# Patient Record
(demographics unavailable — no encounter records)

---

## 2025-03-12 NOTE — HISTORY OF PRESENT ILLNESS
[FreeTextEntry1] : Ms. Cintron was referred by Dr. Sangeeta Borjas; for SBT.  She was found to have a large abdominal / pelvic mass. She was sent for an US identifying a 30 cm mass. She then had a follow up CT scan.   Pre-Op Imaging: Office Abdominal US 1/31/23 (Dr. Borjas) Limited pelvic sonogram due to enlarged multicystic  mass extending in both adnexas and seen up to the  kidneys. The total area is approx 30.7 x 27.0 x 30.4 cm.  Areas of echogenic material and thick walls are seen as  well.  CT AP 1/31/22  REPRODUCTIVE ORGANS: Large mass predominantly cystic in nature with internal septations identified emanating from the pelvis. The overall size of the mass measures 27.3 x 21.0 x.29 cm in size. The mass is displacing the uterus to the right. However this is likely right adnexal in origin because the left adnexa is also mildly enlarged Despite the presence of this large multiloculated cystic mass their is no evidence of free fluid or demonstrable peritoneal disease.  Tumor Markers 1/26/23-  = 793  2/6/23- Exploratory laparotomy, right salpingo-oophorectomy, pelvic and para-aortic  lymph node dissection, omentectomy, peritoneal biopsy, left ovarian biopsy, and lysis of adhesions.  Pathology- 1. Fallopian tube and ovary, right, salpingo-oophorectomy: - Ovarian serous borderline tumor, with microinvasion. - Benign fimbriated fallopian tube. Comment: The tumor is extensively sampled. Two foci of microinvasion identified [place] [facility] [place]  [place]12/23- Pelvic sono- Uterus: 7.8 cm x 3.3 cm x 5.4 cm. Within normal limits. Endometrium: 8 mm. Within normal limits. Right ovary: Removed. Left ovary: 3.7 cm x 2.2 cm x 2.7 cm. Within normal limits. Fluid: None.  6/2023-  = 11; Testosterone remains elevated, Free 11.5 pg/mL and total 79.5 pg / mL; she was referred to endocrinology for further evaluation of testosterone levels. She saw Dr. Sabra Claudio in 12/2023 and weight loss was recommended, f/u appt in April.    10/2023  = 11  1/2024- pelvic sono (TA views only)- Uterus: 7.1 cm x 2.9 cm x 4.5 cm. Within normal limits. Endometrium: 3 mm. Within normal limits. Right ovary: Surgically absent. Left ovary: 3.7 cm x 1.6 cm x 3.1 cm. Within normal limits.  2/1/24- - 10  5/2/24- Pelvic sono (abd views only)- Uterus: 9.1 cm x 3.7 cm x 4.6 cm. Within normal limits. No focal finding. Endometrium: 10 mm. The endometrium is mildly thickened and heterogeneous in echotexture. No discrete focal finding or abnormal vascularity is identified. (THIS WAS PERFORMED 2 DAYS BEFORE HER PERIOD)   Right ovary: Surgically absent. No right adnexal cyst or mass is seen.   Left ovary: 3.0 cm x 2.2 cm x 2.7 cm. The left ovary is grossly unremarkable, containing small follicles.f  8/5/24 Pelvic sono:  Uterus: 8.6 cm x 3.5 cm x 4.8 cm. Within normal limits. Endometrium: 3 mm. Within normal limits. Right ovary: Surgically absent Left ovary: 3.8 cm x 1.8 cm x 3.0 cm. 3.9 x 3.8 x 3.5 cm paraovarian complex cyst without hypervascularity containing internal echogenic material, likely hemorrhagic blood product. Fluid: None. IMPRESSION: 3.9 cm left paraovarian hemorrhagic cyst **per patient her period started 2 days after this sono**  8/15/24:  = 12  11/11/24 Sono  (TA/TV views) Uterus: 8.2 cm x 3.3 cm x 4.5 cm. Within normal limits. Endometrium: 9 mm. Within normal limits. Right ovary: Surgically absent Left ovary: 3.9 cm x 2.2 cm x 3.0 cm. Resolution of paraovarian complex cyst likely hemorrhagic in nature. This is no longer identified. Small simple follicle seen Fluid: None. IMPRESSION:: Resolution of previously seen left hemorrhagic ovarian cyst  11/21/24 = 11  2/24/25 Uterus: 7.6 cm x 3.1 cm x 3.0 cm. Within normal limits. Endometrium: 4 mm. Within normal limits. Right ovary: Removed. Left ovary: 3.1 cm x 2.6 cm x 3.1 cm. Within normal limits. Fluid: None. IMPRESSION: Limited transabdominal exam. The patient declined the transvaginal portion.. Normal uterus and left ovary.  Regular periods q28-31d. She denies abdominal/pelvic pain, dyspnea or chest pain, nausea/vomiting, changes in bowel habits or urination, lower extremity edema or pain.   Not sexually active.  She denies any other associated signs and symptoms.   She has followed up with Dr. Claudio for endocrinology and reports she is seeing her q6m for testosterone levels.  She is here with her mother for appt.  Health Maintenance: BMI: 35 Lifestyle: lives with parents Gardasil Vaccine received: yes COVID vaccine received: yes Mammogram: n/a Colonoscopy: n/a PAP: none, pt is virginal  GYN/Ref: Sangeeta Borjas MD PCP: Dr. Que Flores Psych: Dr. Wood Dowling Endocrnology: Dr. Sabra Claudio - sees q6m

## 2025-03-12 NOTE — DISCUSSION/SUMMARY
[Reviewed Clinical Lab Test(s)] : Results of clinical tests were reviewed. [Reviewed Radiology Report(s)] : Radiology reports were reviewed. [FreeTextEntry1] : - reviewed recent pelvic US results; new sono ordered for prior to next visit. -  today (addendum:  = 11; Patient notified via MOA tasklist. LDS) - f/u with endocrinology  - The risk of recurrence, signs and symptoms  and surveillance plan were reviewed.  - She was advised to see me in 6 months, with sono prior.  - GYN HM with Dr. Borjas. - All questions were answered to their apparent satisfaction.

## 2025-03-12 NOTE — HISTORY OF PRESENT ILLNESS
[FreeTextEntry1] : Ms. Cintron was referred by Dr. Sangeeta Borjas; for SBT.  She was found to have a large abdominal / pelvic mass. She was sent for an US identifying a 30 cm mass. She then had a follow up CT scan.   Pre-Op Imaging: Office Abdominal US 1/31/23 (Dr. Borjas) Limited pelvic sonogram due to enlarged multicystic  mass extending in both adnexas and seen up to the  kidneys. The total area is approx 30.7 x 27.0 x 30.4 cm.  Areas of echogenic material and thick walls are seen as  well.  CT AP 1/31/22  REPRODUCTIVE ORGANS: Large mass predominantly cystic in nature with internal septations identified emanating from the pelvis. The overall size of the mass measures 27.3 x 21.0 x.29 cm in size. The mass is displacing the uterus to the right. However this is likely right adnexal in origin because the left adnexa is also mildly enlarged Despite the presence of this large multiloculated cystic mass their is no evidence of free fluid or demonstrable peritoneal disease.  Tumor Markers 1/26/23-  = 793  2/6/23- Exploratory laparotomy, right salpingo-oophorectomy, pelvic and para-aortic  lymph node dissection, omentectomy, peritoneal biopsy, left ovarian biopsy, and lysis of adhesions.  Pathology- 1. Fallopian tube and ovary, right, salpingo-oophorectomy: - Ovarian serous borderline tumor, with microinvasion. - Benign fimbriated fallopian tube. Comment: The tumor is extensively sampled. Two foci of [place]invasion [place] [facility] [place]- Pelvic sono- Uterus: 7.8 cm x 3.3 cm x 5.4 cm. Within normal limits. Endometrium: 8 mm. Within normal limits. Right ovary: Removed. Left ovary: 3.7 cm x 2.2 cm x 2.7 cm. Within normal limits. Fluid: None.  6/2023-  = 11; Testosterone remains elevated, Free 11.5 pg/mL and total 79.5 pg / mL; she was referred to endocrinology for further evaluation of testosterone levels. She saw Dr. Sabra Claudio in 12/2023 and weight loss was recommended, f/u appt in April.    10/2023  = 11  1/2024- pelvic sono (TA views only)- Uterus: 7.1 cm x 2.9 cm x 4.5 cm. Within normal limits. Endometrium: 3 mm. Within normal limits. Right ovary: Surgically absent. Left ovary: 3.7 cm x 1.6 cm x 3.1 cm. Within normal limits.  2/1/24- - 10  5/2/24- Pelvic sono (abd views only)- Uterus: 9.1 cm x 3.7 cm x 4.6 cm. Within normal limits. No focal finding. Endometrium: 10 mm. The endometrium is mildly thickened and heterogeneous in echotexture. No discrete focal finding or abnormal vascularity is identified. (THIS WAS PERFORMED 2 DAYS BEFORE HER PERIOD)   Right ovary: Surgically absent. No right adnexal cyst or mass is seen.   Left ovary: 3.0 cm x 2.2 cm x 2.7 cm. The left ovary is grossly unremarkable, containing small follicles.f  8/5/24 Pelvic sono:  Uterus: 8.6 cm x 3.5 cm x 4.8 cm. Within normal limits. Endometrium: 3 mm. Within normal limits. Right ovary: Surgically absent Left ovary: 3.8 cm x 1.8 cm x 3.0 cm. 3.9 x 3.8 x 3.5 cm paraovarian complex cyst without hypervascularity containing internal echogenic material, likely hemorrhagic blood product. Fluid: None. IMPRESSION: 3.9 cm left paraovarian hemorrhagic cyst **per patient her period started 2 days after this sono**  8/15/24:  = 12  11/11/24 Sono  (TA/TV views) Uterus: 8.2 cm x 3.3 cm x 4.5 cm. Within normal limits. Endometrium: 9 mm. Within normal limits. Right ovary: Surgically absent Left ovary: 3.9 cm x 2.2 cm x 3.0 cm. Resolution of paraovarian complex cyst likely hemorrhagic in nature. This is no longer identified. Small simple follicle seen Fluid: None. IMPRESSION:: Resolution of previously seen left hemorrhagic ovarian cyst  11/21/24 = 11  2/24/25 Uterus: 7.6 cm x 3.1 cm x 3.0 cm. Within normal limits. Endometrium: 4 mm. Within normal limits. Right ovary: Removed. Left ovary: 3.1 cm x 2.6 cm x 3.1 cm. Within normal limits. Fluid: None. IMPRESSION: Limited transabdominal exam. The patient declined the transvaginal portion.. Normal uterus and left ovary.  Regular periods q28-31d. She denies abdominal/pelvic pain, dyspnea or chest pain, nausea/vomiting, changes in bowel habits or urination, lower extremity edema or pain.   Not sexually active.  She denies any other associated signs and symptoms.   She has followed up with Dr. Claudio for endocrinology and reports she is seeing her q6m for testosterone levels.  She is here with her mother for appt.  Health Maintenance: BMI: 35 Lifestyle: lives with parents Gardasil Vaccine received: yes COVID vaccine received: yes Mammogram: n/a Colonoscopy: n/a PAP: none, pt is virginal  GYN/Ref: Sangeeta Borjas MD PCP: Dr. Que Flores Psych: Dr. Wood Dowling Endocrnology: Dr. Sabra Claudio - sees q6m

## 2025-03-12 NOTE — LETTER BODY
[Dear  ___] : Dear  [unfilled], [I recently saw our patient [unfilled] for a follow-up visit.] : I recently saw our patient, [unfilled] for a follow-up visit. [Attached please find my note.] : Attached please find my note. [FreeTextEntry2] : She will be seeing me regularly for oncologic surveillance and I will keep you updated on her progress. Thank you again for referring this [facility] patient. [FreeTextEntry1] : , sono

## 2025-03-12 NOTE — ASSESSMENT
[FreeTextEntry1] : 25 y/o on SBT surveillance, with baseline elevated testosterone levels followed by endocrinology.

## 2025-03-12 NOTE — PHYSICAL EXAM
[Chaperone Present] : A chaperone was present in the examining room during all aspects of the physical examination [Normal] : Anus and perineum: Normal sphincter tone, no masses, no prolapse. [Fully active, able to carry on all pre-disease performance without restriction] : Status 0 - Fully active, able to carry on all pre-disease performance without restriction [FreeTextEntry2] : Verenice [de-identified] : well-healed laparotomy scar [de-identified] : adnexa nonpalpable [de-identified] : internal pelvic exam deferred today

## 2025-03-12 NOTE — PHYSICAL EXAM
[Chaperone Present] : A chaperone was present in the examining room during all aspects of the physical examination [Normal] : Anus and perineum: Normal sphincter tone, no masses, no prolapse. [Fully active, able to carry on all pre-disease performance without restriction] : Status 0 - Fully active, able to carry on all pre-disease performance without restriction [FreeTextEntry2] : Verenice [de-identified] : well-healed laparotomy scar [de-identified] : adnexa nonpalpable [de-identified] : internal pelvic exam deferred today

## 2025-03-12 NOTE — ASSESSMENT
[FreeTextEntry1] : 23 y/o on SBT surveillance, with baseline elevated testosterone levels followed by endocrinology.